# Patient Record
(demographics unavailable — no encounter records)

---

## 2025-01-02 NOTE — PLAN
[Recommended Classification:____] : - The appropriate IEP classification is: [unfilled] [ADHD EDU/Behav. Strategies (Gen)] : - Those educational and behavioral strategies known to be helpful to children with ADHD should be implemented in the classroom. [Monitor Attention] : - [unfilled]'s attention skills will need to continue to be monitored [Speech/Language] : - Speech and language therapy  [Individual In-School Counseling] : - Individual counseling weekly with school psychologist or  [Social Skills] : - Social skills training [Social Skills Group (child)] : - Enrollment of child in a social skills development group [Other: _____] : - [unfilled] [Fish Oil] : - Dietary supplementation with fish oil as a source of omega-3 fatty acids - guidelines and cautions discussed [Fish Oil Handout] : - Fish oil information and guidelines handout [autismspeaks.org] : - autismspeaks.org - Autism Speaks [[email or web address]] : - [email or web address] - Parent to Parent of [facility] [Follow-up visit (re-evaluation): _____] : - Follow-up visit in [unfilled]  for re-evaluation. [Teacher BRS] : - Newly completed teacher behavior rating scale(s) [Parent BRS] : - Newly completed parent behavior rating scale [IEP or IFSP] : - Copy of most recent Individualized Education Program (IEP) or Family Service Plan (IFSP) [Test reports] : - Reports of most recent psychological, educational, speech/language, PT, OT test results [Accuracy] : Accuracy and reliability of clinical impressions [Findings (To Date)] : Findings from evaluation (to date) [Clinical Basis] : Clinical basis for current diagnosis and clinical impressions [Differential Diagnosis] : Differential diagnosis [Dev. Therapies: ____] : Benefits and limits of developmental therapies: [unfilled] [Counseling] : Benefits and limits of counseling or therapy [Family Questions] : Family's questions were addressed [FreeTextEntry2] : - In the classroom, Janie will need more support, guidance, positive reinforcement and feedback than many of her classmates.  - Placement in a classroom in which teachers have expertise in teaching children with autism is recommended. However, child should be grouped with verbal children who demonstrate interest in communicating, and who do not have serious disruptive behavior problems. Information shared for ASD specific programs including [facility] and [facility]. [FreeTextEntry4] : follow up results for genetic testing [FreeTextEntry5] : The goal of this treatment should be to: - address deficits in communication, social interaction, play skills - address any specific safety concerns - improve self help skills Parents on wait list for center based group [facility] program for the summer -Giving Janie the opportunity to participate in playdates and other extracurricular activities with other children to encourage her social development. [FreeTextEntry8] : Previously discussed [de-identified] : https://www.autismspeaks.org/applied-behavior-analysis [FreeTextEntry1] : - Previously discussed ASD specific programs including [facility] and [facility].  Parents currently content with school setting, but agree with pursuing 12:1:1 setting when available.  - Asked for updated Cognitive testing needed through school district since last testing done 6/2021   - C/w Speech and language therapy through [facility] - Discussed option of pursuing private SLT as well if interested; parents will defer for now - Discussed pursuing [facility] program while pending occupational therapy if interested; parents will defer for now - Agree with push-in [facility], recommend increasing hours when feasible - Janie's attention skills will need to continue to be monitored. P/T PIO provided, parents will return at the earliest availability. Will call with any pressing concerns.  F/u in 6 months

## 2025-01-02 NOTE — HISTORY OF PRESENT ILLNESS
[ICT: _____] : Integrated Co-teaching class (Collaborative Team Teaching) [unfilled] [IEP] : Individualized Education Program [AU] : Autism [S-L: _____] : Speech/Language Therapy [unfilled] [OT: ____] : Occupational Therapy [unfilled] [FreeTextEntry4] : [employer] [TWNoteComboBox1] : 1st Grade [FreeTextEntry1] : Janie is a 6 year old girl with ASD, SLD, and inattention  Private services: - Started [facility] ~ 2 months ago, 9 hours/week but qualified for about 20, limited due to provider availability. Was provided push-in at the school, but received at a center over the holiday break. Say this is the only option that worked based on traveling back and forth from school (which is in [place]), would not have been able to fit in the afternoons otherwise.  Academics:  - Started first grade this year, continuing in an ICT because her school doesn't offer the 12:1:1 she was approved for. May be an option at another branch of this [employer] for next year. Previously discussed [facility] vs. [facility] depending on testing by school, but never pursued as family is happy with the current [employer] school she attends.  - Recommended occupational therapy and was added to IEP this year, but don't have any therapists available at the moment so has not gotten it yet - She is able to read but struggles with comprehension questions, especially with inferences. Parents feel that expressive language deficits limit her ability to respond to questions.  - She can do basic counting, adding and subtracting. Feel she memorizes the numbers because she knows certain answers right away but then may struggle with other similar equations (ex. 9+6 she knows, but didn't respond as quickly to 9+1).   - She has a very good memory. She does well with grasping patterns and applying them.  - Gets good grades, teachers are happy    Behavior:  - She has some difficulty with transitions, and may become upset but recovers quickly. Does well with preparation and "this then that" language, can be redirected. - has not hit other children, not physically aggressive   - She is doing well with following routines and directions - She sometimes makes sounds to get attention  Inattention:  - Easily distracted, needs frequent redirection to finish tasks. Parents feel this has significantly improved, but not completely resolved. Much better at home compared with school.  - No difficulties following the daily routines at home. Does well with 2-steps, but may get distracted and start doing something else before reaching a third step - [facility] working with her on focus    Hyperactivity: always "on the go", but able to sit for longer now  Socialization:  - Her interest in children has increased over time. Seems a little more concerned about others' feelings/emotions. Much more affectionate with others. - Teachers say she has friends at school, tends to attract more social kids in class. Peers look out for her and like helping her. - She is better about interacting with kids if its a physical game, tries to play with peers but has difficulty because of poor communication.  - She goes around saying hi to other kids at school- very friendly.  Emotional: She's a happy child.    Anxiety: No difficulty  from parents, no significant anxiety  Communication:  - Mother reports she has a lot more words in general but still prefers to request using 1 word. She will change her request to longer sentences (ex. "can I have ___ please?" ) when parents give her a look or prompt her to do so - + echolalia - She has inconsistent eye contact. Her eye contact is better with family - She has good response to her name. - She is able to follow 2 step directions, may get distracted if longer. - She uses a few signs " please" "thank you". She does not use descriptive gestures. She points. - In the past, would sing songs/rhyme to make associations with things she saw, had delayed speech  Motor Skills: She has good balance and is athletic, no delays in motor skills. She is still working on her pencil , qualified for OT as indicated but not currently getting.  Play Skills: She lines up her toys sometimes. Plays with a variety of toys, demonstrates pretend play    Repetitive Behavior: She likes routine, but is usually flexible with changes if the change is a fun change  Sensory: She does not like the sound of airplanes and will cover her ears. not sensitive to texture.   General: Activities of Daily living: dress herself, brushes her teeth, feeds herself, able to use the microwave, good in helping with her sister. Does well in listening and cooperating with parents' instructions- if they tell her not to touch something, she won't. Has safety awareness. Won't run into the street, no elopement.  Toilet/Constipation: no issues Sleep: Bedtime is at 8pm. Occasional night awakenings, if she does she will go to parents bed and falls right back asleep. Wakes up at 5/6am for the day. Eating: She likes breads, carbs, fruits. Will have tomato sauce. Will not eat meat or vegetables. Will have protein pancakes, eggs, yogurt. She is not interested in trying new things.  Medical workup Neurology : no evaluation Neuroimaging: no imaging Genetics: evaluated by genetics, w/u w/o atypical findings Hearing: evaluated by audiology- wnl per report Vision: no concerns [Major Illness] : no major illness [Major Injury] : no major injury [Surgery] : no surgery [Hospitalizations] : no hospitalizations [New Medications] : no new medication [New Allergies] : no new allergies

## 2025-01-02 NOTE — PHYSICAL EXAM
[External ears normal] : external ears normal [Normal] : patient has a normal gait [de-identified] : - pointed to music box on shelf and repeated "box" verbally with some attempts at utilizing gaze but not robust - repeated occasions of immediate echoing observed with parent, though often followed demands along with the echoing - played quietly with sister for much of visit. No disruptive behaviors.

## 2025-01-02 NOTE — HISTORY OF PRESENT ILLNESS
[ICT: _____] : Integrated Co-teaching class (Collaborative Team Teaching) [unfilled] [IEP] : Individualized Education Program [AU] : Autism [S-L: _____] : Speech/Language Therapy [unfilled] [OT: ____] : Occupational Therapy [unfilled] [FreeTextEntry4] : [employer] [TWNoteComboBox1] : 1st Grade [FreeTextEntry1] : Janie is a 6 year old girl with ASD, SLD, and inattention  Private services: - Started [facility] ~ 2 months ago, 9 hours/week but qualified for about 20, limited due to provider availability. Was provided push-in at the school, but received at a center over the holiday break. Say this is the only option that worked based on traveling back and forth from school (which is in [place]), would not have been able to fit in the afternoons otherwise.  Academics:  - Started first grade this year, continuing in an ICT because her school doesn't offer the 12:1:1 she was approved for. May be an option at another branch of this [employer] for next year. Previously discussed [facility] vs. [facility] depending on testing by school, but never pursued as family is happy with the current [employer] school she attends.  - Recommended occupational therapy and was added to IEP this year, but don't have any therapists available at the moment so has not gotten it yet - She is able to read but struggles with comprehension questions, especially with inferences. Parents feel that expressive language deficits limit her ability to respond to questions.  - She can do basic counting, adding and subtracting. Feel she memorizes the numbers because she knows certain answers right away but then may struggle with other similar equations (ex. 9+6 she knows, but didn't respond as quickly to 9+1).   - She has a very good memory. She does well with grasping patterns and applying them.  - Gets good grades, teachers are happy    Behavior:  - She has some difficulty with transitions, and may become upset but recovers quickly. Does well with preparation and "this then that" language, can be redirected. - has not hit other children, not physically aggressive   - She is doing well with following routines and directions - She sometimes makes sounds to get attention  Inattention:  - Easily distracted, needs frequent redirection to finish tasks. Parents feel this has significantly improved, but not completely resolved. Much better at home compared with school.  - No difficulties following the daily routines at home. Does well with 2-steps, but may get distracted and start doing something else before reaching a third step - [facility] working with her on focus    Hyperactivity: always "on the go", but able to sit for longer now  Socialization:  - Her interest in children has increased over time. Seems a little more concerned about others' feelings/emotions. Much more affectionate with others. - Teachers say she has friends at school, tends to attract more social kids in class. Peers look out for her and like helping her. - She is better about interacting with kids if its a physical game, tries to play with peers but has difficulty because of poor communication.  - She goes around saying hi to other kids at school- very friendly.  Emotional: She's a happy child.    Anxiety: No difficulty  from parents, no significant anxiety  Communication:  - Mother reports she has a lot more words in general but still prefers to request using 1 word. She will change her request to longer sentences (ex. "can I have ___ please?" ) when parents give her a look or prompt her to do so - + echolalia - She has inconsistent eye contact. Her eye contact is better with family - She has good response to her name. - She is able to follow 2 step directions, may get distracted if longer. - She uses a few signs " please" "thank you". She does not use descriptive gestures. She points. - In the past, would sing songs/rhyme to make associations with things she saw, had delayed speech  Motor Skills: She has good balance and is athletic, no delays in motor skills. She is still working on her pencil , qualified for OT as indicated but not currently getting.  Play Skills: She lines up her toys sometimes. Plays with a variety of toys, demonstrates pretend play    Repetitive Behavior: She likes routine, but is usually flexible with changes if the change is a fun change  Sensory: She does not like the sound of airplanes and will cover her ears. not sensitive to texture.   General: Activities of Daily living: dress herself, brushes her teeth, feeds herself, able to use the microwave, good in helping with her sister. Does well in listening and cooperating with parents' instructions- if they tell her not to touch something, she won't. Has safety awareness. Won't run into the street, no elopement.  Toilet/Constipation: no issues Sleep: Bedtime is at 8pm. Occasional night awakenings, if she does she will go to parents bed and falls right back asleep. Wakes up at 5/6am for the day. Eating: She likes breads, carbs, fruits. Will have tomato sauce. Will not eat meat or vegetables. Will have protein pancakes, eggs, yogurt. She is not interested in trying new things.  Medical workup Neurology : no evaluation Neuroimaging: no imaging Genetics: evaluated by genetics, w/u w/o atypical findings Hearing: evaluated by audiology- wnl per report Vision: no concerns [Major Illness] : no major illness [Major Injury] : no major injury [Surgery] : no surgery [Hospitalizations] : no hospitalizations [New Medications] : no new medication [New Allergies] : no new allergies

## 2025-01-02 NOTE — HISTORY OF PRESENT ILLNESS
[ICT: _____] : Integrated Co-teaching class (Collaborative Team Teaching) [unfilled] [IEP] : Individualized Education Program [AU] : Autism [S-L: _____] : Speech/Language Therapy [unfilled] [OT: ____] : Occupational Therapy [unfilled] [FreeTextEntry4] : [employer] [TWNoteComboBox1] : 1st Grade [FreeTextEntry1] : Janie is a 6 year old girl with ASD, SLD, and inattention  Private services: - Started [facility] ~ 2 months ago, 9 hours/week but qualified for about 20, limited due to provider availability. Was provided push-in at the school, but received at a center over the holiday break. Say this is the only option that worked based on traveling back and forth from school (which is in [place]), would not have been able to fit in the afternoons otherwise.  Academics:  - Started first grade this year, continuing in an ICT because her school doesn't offer the 12:1:1 she was approved for. May be an option at another branch of this [employer] for next year. Previously discussed [facility] vs. [facility] depending on testing by school, but never pursued as family is happy with the current [employer] school she attends.  - Recommended occupational therapy and was added to IEP this year, but don't have any therapists available at the moment so has not gotten it yet - She is able to read but struggles with comprehension questions, especially with inferences. Parents feel that expressive language deficits limit her ability to respond to questions.  - She can do basic counting, adding and subtracting. Feel she memorizes the numbers because she knows certain answers right away but then may struggle with other similar equations (ex. 9+6 she knows, but didn't respond as quickly to 9+1).   - She has a very good memory. She does well with grasping patterns and applying them.  - Gets good grades, teachers are happy    Behavior:  - She has some difficulty with transitions, and may become upset but recovers quickly. Does well with preparation and "this then that" language, can be redirected. - has not hit other children, not physically aggressive   - She is doing well with following routines and directions - She sometimes makes sounds to get attention  Inattention:  - Easily distracted, needs frequent redirection to finish tasks. Parents feel this has significantly improved, but not completely resolved. Much better at home compared with school.  - No difficulties following the daily routines at home. Does well with 2-steps, but may get distracted and start doing something else before reaching a third step - [facility] working with her on focus    Hyperactivity: always "on the go", but able to sit for longer now  Socialization:  - Her interest in children has increased over time. Seems a little more concerned about others' feelings/emotions. Much more affectionate with others. - Teachers say she has friends at school, tends to attract more social kids in class. Peers look out for her and like helping her. - She is better about interacting with kids if its a physical game, tries to play with peers but has difficulty because of poor communication.  - She goes around saying hi to other kids at school- very friendly.  Emotional: She's a happy child.    Anxiety: No difficulty  from parents, no significant anxiety  Communication:  - Mother reports she has a lot more words in general but still prefers to request using 1 word. She will change her request to longer sentences (ex. "can I have ___ please?" ) when parents give her a look or prompt her to do so - + echolalia - She has inconsistent eye contact. Her eye contact is better with family - She has good response to her name. - She is able to follow 2 step directions, may get distracted if longer. - She uses a few signs " please" "thank you". She does not use descriptive gestures. She points. - In the past, would sing songs/rhyme to make associations with things she saw, had delayed speech  Motor Skills: She has good balance and is athletic, no delays in motor skills. She is still working on her pencil , qualified for OT as indicated but not currently getting.  Play Skills: She lines up her toys sometimes. Plays with a variety of toys, demonstrates pretend play    Repetitive Behavior: She likes routine, but is usually flexible with changes if the change is a fun change  Sensory: She does not like the sound of airplanes and will cover her ears. not sensitive to texture.   General: Activities of Daily living: dress herself, brushes her teeth, feeds herself, able to use the microwave, good in helping with her sister. Does well in listening and cooperating with parents' instructions- if they tell her not to touch something, she won't. Has safety awareness. Won't run into the street, no elopement.  Toilet/Constipation: no issues Sleep: Bedtime is at 8pm. Occasional night awakenings, if she does she will go to parents bed and falls right back asleep. Wakes up at 5/6am for the day. Eating: She likes breads, carbs, fruits. Will have tomato sauce. Will not eat meat or vegetables. Will have protein pancakes, eggs, yogurt. She is not interested in trying new things.  Medical workup Neurology : no evaluation Neuroimaging: no imaging Genetics: evaluated by genetics, w/u w/o atypical findings Hearing: evaluated by audiology- wnl per report Vision: no concerns [Major Illness] : no major illness [Major Injury] : no major injury [Surgery] : no surgery [Hospitalizations] : no hospitalizations [New Medications] : no new medication [New Allergies] : no new allergies

## 2025-01-02 NOTE — FAMILY HISTORY
[FreeTextEntry1] : Older brother (paternal half brother) has ADHD Otherwise denies family history of autism, seizures, learning difficulties, intellectual disability , anxiety, depression, obsessive compulsive disorder, bipolar disorder or schizophrenia

## 2025-01-02 NOTE — HISTORY OF PRESENT ILLNESS
[ICT: _____] : Integrated Co-teaching class (Collaborative Team Teaching) [unfilled] [IEP] : Individualized Education Program [AU] : Autism [S-L: _____] : Speech/Language Therapy [unfilled] [OT: ____] : Occupational Therapy [unfilled] [FreeTextEntry4] : [employer] [TWNoteComboBox1] : 1st Grade [FreeTextEntry1] : Janie is a 6 year old girl with ASD, SLD, and inattention  Private services: - Started [facility] ~ 2 months ago, 9 hours/week but qualified for about 20, limited due to provider availability. Was provided push-in at the school, but received at a center over the holiday break. Say this is the only option that worked based on traveling back and forth from school (which is in [place]), would not have been able to fit in the afternoons otherwise.  Academics:  - Started first grade this year, continuing in an ICT because her school doesn't offer the 12:1:1 she was approved for. May be an option at another branch of this [facility] for next year. Previously discussed [facility] vs. [facility] depending on testing by school, but never pursued as family is happy with the current [employer] she attends.  - Recommended occupational therapy and was added to IEP this year, but don't have any therapists available at the moment so has not gotten it yet - She is able to read but struggles with comprehension questions, especially with inferences. Parents feel that expressive language deficits limit her ability to respond to questions.  - She can do basic counting, adding and subtracting. Feel she memorizes the numbers because she knows certain answers right away but then may struggle with other similar equations (ex. 9+6 she knows, but didn't respond as quickly to 9+1).   - She has a very good memory. She does well with grasping patterns and applying them.  - Gets good grades, teachers are happy    Behavior:  - She has some difficulty with transitions, and may become upset but recovers quickly. Does well with preparation and "this then that" language, can be redirected. - has not hit other children, not physically aggressive   - She is doing well with following routines and directions - She sometimes makes sounds to get attention  Inattention:  - Easily distracted, needs frequent redirection to finish tasks. Parents feel this has significantly improved, but not completely resolved. Much better at home compared with school.  - No difficulties following the daily routines at home. Does well with 2-steps, but may get distracted and start doing something else before reaching a third step - [facility] working with her on focus    Hyperactivity: always "on the go", but able to sit for longer now  Socialization:  - Her interest in children has increased over time. Seems a little more concerned about others' feelings/emotions. Much more affectionate with others. - Teachers say she has friends at school, tends to attract more social kids in class. Peers look out for her and like helping her. - She is better about interacting with kids if its a physical game, tries to play with peers but has difficulty because of poor communication.  - She goes around saying hi to other kids at school- very friendly.  Emotional: She's a happy child.    Anxiety: No difficulty  from parents, no significant anxiety  Communication:  - Mother reports she has a lot more words in general but still prefers to request using 1 word. She will change her request to longer sentences (ex. "can I have ___ please?" ) when parents give her a look or prompt her to do so - + echolalia - She has inconsistent eye contact. Her eye contact is better with family - She has good response to her name. - She is able to follow 2 step directions, may get distracted if longer. - She uses a few signs " please" "thank you". She does not use descriptive gestures. She points. - In the past, would sing songs/rhyme to make associations with things she saw, had delayed speech  Motor Skills: She has good balance and is athletic, no delays in motor skills. She is still working on her pencil , qualified for OT as indicated but not currently getting.  Play Skills: She lines up her toys sometimes. Plays with a variety of toys, demonstrates pretend play    Repetitive Behavior: She likes routine, but is usually flexible with changes if the change is a fun change  Sensory: She does not like the sound of airplanes and will cover her ears. not sensitive to texture.   General: Activities of Daily living: dress herself, brushes her teeth, feeds herself, able to use the microwave, good in helping with her sister. Does well in listening and cooperating with parents' instructions- if they tell her not to touch something, she won't. Has safety awareness. Won't run into the street, no elopement.  Toilet/Constipation: no issues Sleep: Bedtime is at 8pm. Occasional night awakenings, if she does she will go to parents bed and falls right back asleep. Wakes up at 5/6am for the day. Eating: She likes breads, carbs, fruits. Will have tomato sauce. Will not eat meat or vegetables. Will have protein pancakes, eggs, yogurt. She is not interested in trying new things.  Medical workup Neurology : no evaluation Neuroimaging: no imaging Genetics: evaluated by genetics, w/u w/o atypical findings Hearing: evaluated by audiology- wnl per report Vision: no concerns [Major Illness] : no major illness [Major Injury] : no major injury [Surgery] : no surgery [Hospitalizations] : no hospitalizations [New Medications] : no new medication [New Allergies] : no new allergies

## 2025-01-02 NOTE — ALLERGIES
[TextEntry] : Possibly allergic to Bactrim (mother and younger sister have Bactrim allergies, hence mother is concerned Janie may have the same)

## 2025-01-02 NOTE — PLAN
[Recommended Classification:____] : - The appropriate IEP classification is: [unfilled] [ADHD EDU/Behav. Strategies (Gen)] : - Those educational and behavioral strategies known to be helpful to children with ADHD should be implemented in the classroom. [Monitor Attention] : - [unfilled]'s attention skills will need to continue to be monitored [Speech/Language] : - Speech and language therapy  [Individual In-School Counseling] : - Individual counseling weekly with school psychologist or  [Social Skills] : - Social skills training [Social Skills Group (child)] : - Enrollment of child in a social skills development group [Other: _____] : - [unfilled] [Fish Oil] : - Dietary supplementation with fish oil as a source of omega-3 fatty acids - guidelines and cautions discussed [Fish Oil Handout] : - Fish oil information and guidelines handout [autismspeaks.org] : - autismspeaks.org - Autism Speaks [[email or web address]] : - [email or web address] - Parent to Parent of [facility] [Follow-up visit (re-evaluation): _____] : - Follow-up visit in [unfilled]  for re-evaluation. [Teacher BRS] : - Newly completed teacher behavior rating scale(s) [Parent BRS] : - Newly completed parent behavior rating scale [IEP or IFSP] : - Copy of most recent Individualized Education Program (IEP) or Family Service Plan (IFSP) [Test reports] : - Reports of most recent psychological, educational, speech/language, PT, OT test results [Accuracy] : Accuracy and reliability of clinical impressions [Findings (To Date)] : Findings from evaluation (to date) [Clinical Basis] : Clinical basis for current diagnosis and clinical impressions [Differential Diagnosis] : Differential diagnosis [Dev. Therapies: ____] : Benefits and limits of developmental therapies: [unfilled] [Counseling] : Benefits and limits of counseling or therapy [Family Questions] : Family's questions were addressed [FreeTextEntry2] : - In the classroom, Janie will need more support, guidance, positive reinforcement and feedback than many of her classmates.  - Placement in a classroom in which teachers have expertise in teaching children with autism is recommended. However, child should be grouped with verbal children who demonstrate interest in communicating, and who do not have serious disruptive behavior problems. Information shared for ASD specific programs including [facility] and [facility]. [FreeTextEntry4] : follow up results for genetic testing [FreeTextEntry5] : The goal of this treatment should be to: - address deficits in communication, social interaction, play skills - address any specific safety concerns - improve self help skills Parents on wait list for center based group [facility] program for the summer -Giving Janie the opportunity to participate in playdates and other extracurricular activities with other children to encourage her social development. [FreeTextEntry8] : Previously discussed [de-identified] : https://www.autismspeaks.org/applied-behavior-analysis [FreeTextEntry1] : - Previously discussed ASD specific programs including [facility] and [facility].  Parents currently content with school setting, but agree with pursuing 12:1:1 setting when available.  - Asked for updated Cognitive testing needed through school district since last testing done 6/2021   - C/w Speech and language therapy through [facility] - Discussed option of pursuing private SLT as well if interested; parents will defer for now - Discussed pursuing [facility] program while pending occupational therapy if interested; parents will defer for now - Agree with push-in [facility], recommend increasing hours when feasible - Janie's attention skills will need to continue to be monitored. P/T PIO provided, parents will return at the earliest availability. Will call with any pressing concerns.  F/u in 6 months

## 2025-01-02 NOTE — SOCIAL HISTORY
[FreeTextEntry2] :  [FreeTextEntry3] : /  [FreeTextEntry6] : Lives with mother, father and younger sister Suyapa (2 years younger)

## 2025-01-02 NOTE — PLAN
[Recommended Classification:____] : - The appropriate IEP classification is: [unfilled] [ADHD EDU/Behav. Strategies (Gen)] : - Those educational and behavioral strategies known to be helpful to children with ADHD should be implemented in the classroom. [Monitor Attention] : - [unfilled]'s attention skills will need to continue to be monitored [Speech/Language] : - Speech and language therapy  [Individual In-School Counseling] : - Individual counseling weekly with school psychologist or  [Social Skills] : - Social skills training [Social Skills Group (child)] : - Enrollment of child in a social skills development group [Other: _____] : - [unfilled] [Fish Oil] : - Dietary supplementation with fish oil as a source of omega-3 fatty acids - guidelines and cautions discussed [Fish Oil Handout] : - Fish oil information and guidelines handout [autismspeaks.org] : - autismspeaks.org - Autism Speaks [[email or web address].org] : - [email or web address] - Parent to Parent of [facility] [Follow-up visit (re-evaluation): _____] : - Follow-up visit in [unfilled]  for re-evaluation. [Teacher BRS] : - Newly completed teacher behavior rating scale(s) [Parent BRS] : - Newly completed parent behavior rating scale [IEP or IFSP] : - Copy of most recent Individualized Education Program (IEP) or Family Service Plan (IFSP) [Test reports] : - Reports of most recent psychological, educational, speech/language, PT, OT test results [Accuracy] : Accuracy and reliability of clinical impressions [Findings (To Date)] : Findings from evaluation (to date) [Clinical Basis] : Clinical basis for current diagnosis and clinical impressions [Differential Diagnosis] : Differential diagnosis [Dev. Therapies: ____] : Benefits and limits of developmental therapies: [unfilled] [Counseling] : Benefits and limits of counseling or therapy [Family Questions] : Family's questions were addressed [FreeTextEntry2] : - In the classroom, Janie will need more support, guidance, positive reinforcement and feedback than many of her classmates.  - Placement in a classroom in which teachers have expertise in teaching children with autism is recommended. However, child should be grouped with verbal children who demonstrate interest in communicating, and who do not have serious disruptive behavior problems. Information shared for ASD specific programs including [facility] and [facility]. [FreeTextEntry4] : follow up results for genetic testing [FreeTextEntry5] : The goal of this treatment should be to: - address deficits in communication, social interaction, play skills - address any specific safety concerns - improve self help skills Parents on wait list for center based group [facility] program for the summer -Giving Janie the opportunity to participate in playdates and other extracurricular activities with other children to encourage her social development. [FreeTextEntry8] : Previously discussed [de-identified] : https://www.autismspeaks.org/applied-behavior-analysis [FreeTextEntry1] : - Previously discussed ASD specific programs including [facility] and [facility].  Parents currently content with school setting, but agree with pursuing 12:1:1 setting when available.  - Asked for updated Cognitive testing needed through school district since last testing done 6/2021   - [place] [facility] [place] Discussed option of pursuing private SLT as well if interested; parents will defer for now - Discussed pursuing SEED program while pending occupational therapy if interested; parents will defer for now - Agree with push-in [facility], recommend increasing hours when feasible - Janie's attention skills will need to continue to be monitored. P/T PIO provided, parents will return at the earliest availability. Will call with any pressing concerns.  F/u in 6 months

## 2025-01-02 NOTE — PLAN
[Recommended Classification:____] : - The appropriate IEP classification is: [unfilled] [ADHD EDU/Behav. Strategies (Gen)] : - Those educational and behavioral strategies known to be helpful to children with ADHD should be implemented in the classroom. [Monitor Attention] : - [unfilled]'s attention skills will need to continue to be monitored [Speech/Language] : - Speech and language therapy  [Individual In-School Counseling] : - Individual counseling weekly with school psychologist or  [Social Skills] : - Social skills training [Social Skills Group (child)] : - Enrollment of child in a social skills development group [Other: _____] : - [unfilled] [Fish Oil] : - Dietary supplementation with fish oil as a source of omega-3 fatty acids - guidelines and cautions discussed [Fish Oil Handout] : - Fish oil information and guidelines handout [autismspeaks.org] : - autismspeaks.org - Autism Speaks [[email or web address].org] : - [email or web address] - Parent to Parent of [facility] [Follow-up visit (re-evaluation): _____] : - Follow-up visit in [unfilled]  for re-evaluation. [Teacher BRS] : - Newly completed teacher behavior rating scale(s) [Parent BRS] : - Newly completed parent behavior rating scale [IEP or IFSP] : - Copy of most recent Individualized Education Program (IEP) or Family Service Plan (IFSP) [Test reports] : - Reports of most recent psychological, educational, speech/language, PT, OT test results [Accuracy] : Accuracy and reliability of clinical impressions [Findings (To Date)] : Findings from evaluation (to date) [Clinical Basis] : Clinical basis for current diagnosis and clinical impressions [Differential Diagnosis] : Differential diagnosis [Dev. Therapies: ____] : Benefits and limits of developmental therapies: [unfilled] [Counseling] : Benefits and limits of counseling or therapy [Family Questions] : Family's questions were addressed [FreeTextEntry2] : - In the classroom, Janie will need more support, guidance, positive reinforcement and feedback than many of her classmates.  - Placement in a classroom in which teachers have expertise in teaching children with autism is recommended. However, child should be grouped with verbal children who demonstrate interest in communicating, and who do not have serious disruptive behavior problems. Information shared for ASD specific programs including [facility] and [facility]. [FreeTextEntry4] : follow up results for genetic testing [FreeTextEntry5] : The goal of this treatment should be to: - address deficits in communication, social interaction, play skills - address any specific safety concerns - improve self help skills Parents on wait list for center based group [facility] program for the summer -Giving Janie the opportunity to participate in playdates and other extracurricular activities with other children to encourage her social development. [FreeTextEntry8] : Previously discussed [de-identified] : https://www.autismspeaks.org/applied-behavior-analysis [FreeTextEntry1] : - Previously discussed ASD specific programs including [facility] and [facility].  Parents currently content with school setting, but agree with pursuing 12:1:1 setting when available.  - Asked for updated Cognitive testing needed through school district since last testing done 6/2021   - C/w Speech and language therapy through [facility] - Discussed option of pursuing private SLT as well if interested; parents will defer for now - Discussed pursuing SEED program while pending occupational therapy if interested; parents will defer for now - Agree with push-in [facility], recommend increasing hours when feasible - Janie's attention skills will need to continue to be monitored. P/T PIO provided, parents will return at the earliest availability. Will call with any pressing concerns.  F/u in 6 months

## 2025-01-02 NOTE — SOCIAL HISTORY
Refer to the Assessment tab to view/cancel completed assessment. [FreeTextEntry2] :  [FreeTextEntry3] : /  [FreeTextEntry6] : Lives with mother, father and younger sister Suyapa (2 years younger)

## 2025-01-02 NOTE — PHYSICAL EXAM
[External ears normal] : external ears normal [Normal] : patient has a normal gait [de-identified] : - pointed to music box on shelf and repeated "box" verbally with some attempts at utilizing gaze but not robust - repeated occasions of immediate echoing observed with parent, though often followed demands along with the echoing - played quietly with sister for much of visit. No disruptive behaviors.

## 2025-01-02 NOTE — PHYSICAL EXAM
[External ears normal] : external ears normal [Normal] : patient has a normal gait [de-identified] : - pointed to music box on shelf and repeated "box" verbally with some attempts at utilizing gaze but not robust - repeated occasions of immediate echoing observed with parent, though often followed demands along with the echoing - played quietly with sister for much of visit. No disruptive behaviors.

## 2025-01-02 NOTE — REASON FOR VISIT
[Follow-Up Visit] : a follow-up visit for [Autism Spectrum Disorder] : autism spectrum disorder [Speech/Language] : speech/language problems [Mother] : mother [Father] : father [FreeTextEntry3] : 6/2024

## 2025-07-08 NOTE — PAST MEDICAL HISTORY
Verbal/written post procedure instructions were given to patient/caregiver./Instructed patient/caregiver to follow-up with primary care physician./Instructed patient/caregiver regarding signs and symptoms of infection.
[FreeTextEntry1] : No  staring spells or seizure like activity
[FreeTextEntry1] : No  staring spells or seizure like activity

## 2025-07-08 NOTE — PLAN
[Accuracy] : Accuracy and reliability of clinical impressions [Findings (To Date)] : Findings from evaluation (to date) [Clinical Basis] : Clinical basis for current diagnosis and clinical impressions [Differential Diagnosis] : Differential diagnosis [Dev. Therapies: ____] : Benefits and limits of developmental therapies: [unfilled] [Counseling] : Benefits and limits of counseling or therapy [Family Questions] : Family's questions were addressed [FreeTextEntry1] : - Previously discussed ASD specific programs including [facility] and [facility].  Parents currently content with school setting, and doing well academically with the private [facility] pushing in, so will maintain the same placement for next year - C/w Speech and language therapy through [facility] - Agree with [facility]-provided OT on weekends - Agree with pursuing SEED program  - Agree with increased [facility] hours - briefly discussed CAM use, including the current LingoLeap drops parents are using vs. Leucovorin. Will send parents information on Leucovorin studies. - Janie's attention skills will need to continue to be monitored. P/T PIO provided, will obtain prior to net visit. Will call with any pressing concerns.  F/u in 6 months

## 2025-07-08 NOTE — REASON FOR VISIT
[Follow-Up Visit] : a follow-up visit for [Autism Spectrum Disorder] : autism spectrum disorder [Speech/Language] : speech/language problems [Mother] : mother [Father] : father [FreeTextEntry3] : 1/2025

## 2025-07-08 NOTE — HISTORY OF PRESENT ILLNESS
[ICT: _____] : Integrated Co-teaching class (Collaborative Team Teaching) [unfilled] [IEP] : Individualized Education Program [AU] : Autism [OT: ____] : Occupational Therapy [unfilled] [S-L: _____] : Speech/Language Therapy [unfilled] [Mother] : mother [Home] : at home, [unfilled] , at the time of the visit. [Other Location: e.g. Home (Enter Location, City,State)___] : at [unfilled] [Telehealth (audio & video)] : This visit was provided via telehealth using real-time 2-way audio visual technology. [FreeTextEntry3] : Mother [FreeTextEntry4] : [employer] [TWNoteComboBox1] : 1st Grade [FreeTextEntry1] : Janie is a 7 year old girl with ASD, SLD, and inattention  Interim Hx: - Made a good progress with spontaneous language, especially when family goes on trips over the summer- will be more communicative. Uses a lot of "first___, then___" language with parents. - Recently started a supplement (Lingo Leap- Zeolite/B12/VitD) that parents feel like has been helpful, but can't say if it's more the [facility] that made a difference or the supplement  Private services: - Started [facility] in fall of 2024, 15 hours/week (3 hours in school every day) but qualified for about 20, limited due to provider availability. Was provided push-in at the school, but received at a center over holiday breaks. Say this is the only option that worked based on traveling back and forth from school (which is in [place]), would not have been able to fit in the afternoons otherwise. - Goes to an OT center near home on Sundays through [facility] ([facility] [place])  Academics:  - Completed 1st grade in ICT because her school didn't offer the 12:1:1 she was approved for. Teacher RS from January show GL performance, and was doing well in this setting with her [facility] provider, so will keep int he same setting. Previously discussed [employer] vs. [employer] depending on testing by school, but never pursued as family is happy with the current [employer] school she attends. Will have an IEP reconvene in September.  - Occupational therapy added to IEP this past year, but didn't have any therapists available. Pending assignments for next year. - She is able to read but struggles with comprehension questions, especially with inferences. Parents feel that expressive language deficits limit her ability to respond to questions.  - She can do basic counting, adding and subtracting. Feel she memorizes the numbers because she knows certain answers right away but then may struggle with other similar equations (ex. 9+6 she knows, but didn't respond as quickly to 9+1).   - She has very good memory. She does well with grasping patterns and applying them.  - Got good grades this year, teachers are happy. She was being praised about good progress this year.  - Doing a lot of activities with family this summer, planning to go to [employer] [facility] at the end of the month. Grandparents helping a bit during the summer, and she goes to an [facility] center x3 days/week.     Behavior:  - She has some difficulty with transitions, and may become upset but recovers quickly. Does well with preparation and "this then that" language, can be redirected. - She is doing well with following routines and directions - She sometimes makes sounds to get attention  Inattention:  - Easily distracted, needs frequent redirection to finish tasks. Parents feel this has significantly improved, but not completely resolved. Much better at home compared with school.  - No difficulties following the daily routines at home. Does well with 2-steps, but may get distracted and start doing something else before reaching a third step - [employer] working with her on focus    Hyperactivity: always "on the go", but able to sit for longer now  Socialization:  - Her interest in children has increased over time. Seems more concerned about others' feelings/emotions. Much more affectionate with others. - Teachers say she has friends at school, tends to attract more social kids in class. Peers look out for her and like helping her. - She is better about interacting with kids if its a physical game, tries to play with peers but has difficulty because of poor communication.  - She goes around saying hi to other kids at school- very friendly.  Emotional: She's a happy child.    Anxiety: No difficulty  from parents, no significant anxiety  Communication:  - Mother reports she has a lot more words in general but still prefers to request using 1 word. She will change her request to longer sentences (ex. "can I have ___ please?" ) when parents give her a look or prompt her to do so - (+) echolalia - She has inconsistent eye contact. Her eye contact is better with family - She has good response to her name. - She is able to follow 2 step directions, may get distracted if longer. - She uses a few signs " please" "thank you". She does not use descriptive gestures. She points. - In the past, would sing songs/rhyme to make associations with things she saw, had delayed speech  Motor Skills: She has good balance and is athletic, no delays in motor skills. She is still working on her pencil , qualified for OT as indicated but not currently getting.  Play Skills: She lines up her toys sometimes. Plays with a variety of toys, demonstrates pretend play    Repetitive Behavior: She likes routine, but is usually flexible with changes if the change is a fun change  Sensory: She does not like the sound of airplanes and will cover her ears. not sensitive to texture.   General: Activities of Daily living: dress herself, brushes her teeth, feeds herself, able to use the microwave, good in helping with her sister. Does well in listening and cooperating with parents' instructions- if they tell her not to touch something, she won't. Has safety awareness. Won't run into the street, no elopement.  Toilet/Constipation: no issues Sleep: Bedtime is at 8pm. Occasional night awakenings, if she does she will go to parents bed and falls right back asleep. Wakes up at 5/6am for the day. Eating: She likes breads, carbs, fruits. Will have tomato sauce. Will not eat meat or vegetables. Will have protein pancakes, eggs, yogurt. She is not interested in trying new things.  Medical workup Neurology : no evaluation Neuroimaging: no imaging Genetics: evaluated by genetics, w/u w/o atypical findings Hearing: evaluated by audiology- wnl per report Vision: no concerns [Major Illness] : no major illness [Major Injury] : no major injury [Surgery] : no surgery [Hospitalizations] : no hospitalizations [New Medications] : no new medication [New Allergies] : no new allergies

## 2025-07-08 NOTE — HISTORY OF PRESENT ILLNESS
[ICT: _____] : Integrated Co-teaching class (Collaborative Team Teaching) [unfilled] [IEP] : Individualized Education Program [AU] : Autism [OT: ____] : Occupational Therapy [unfilled] [S-L: _____] : Speech/Language Therapy [unfilled] [Mother] : mother [Home] : at home, [unfilled] , at the time of the visit. [Other Location: e.g. Home (Enter Location, City,State)___] : at [unfilled] [Telehealth (audio & video)] : This visit was provided via telehealth using real-time 2-way audio visual technology. [FreeTextEntry3] : Mother [FreeTextEntry4] : [employer] [TWNoteComboBox1] : 1st Grade [FreeTextEntry1] : Janie is a 7 year old girl with ASD, SLD, and inattention  Interim Hx: - Made a good progress with spontaneous language, especially when family goes on trips over the summer- will be more communicative. Uses a lot of "first___, then___" language with parents. - Recently started a supplement (Lingo Leap- Zeolite/B12/VitD) that parents feel like has been helpful, but can't say if it's more the [employer] that made a difference or the supplement  Private services: - Started [facility] in fall of 2024, 15 hours/week (3 hours in school every day) but qualified for about 20, limited due to provider availability. Was provided push-in at the school, but received at a center over holiday breaks. Say this is the only option that worked based on traveling back and forth from school (which is in [place]), would not have been able to fit in the afternoons otherwise. - Goes to an OT center near home on Sundays through [facility] ([facility] [place])  Academics:  - Completed 1st grade in ICT because her school didn't offer the 12:1:1 she was approved for. Teacher RS from January show GL performance, and was doing well in this setting with her [facility] provider, so will keep int he same setting. Previously discussed [employer] vs. [employer] depending on testing by school, but never pursued as family is happy with the current [employer] school she attends. Will have an IEP reconvene in September.  - Occupational therapy added to IEP this past year, but didn't have any therapists available. Pending assignments for next year. - She is able to read but struggles with comprehension questions, especially with inferences. Parents feel that expressive language deficits limit her ability to respond to questions.  - She can do basic counting, adding and subtracting. Feel she memorizes the numbers because she knows certain answers right away but then may struggle with other similar equations (ex. 9+6 she knows, but didn't respond as quickly to 9+1).   - She has very good memory. She does well with grasping patterns and applying them.  - Got good grades this year, teachers are happy. She was being praised about good progress this year.  - Doing a lot of activities with family this summer, planning to go to [employer] at the end of the month. Grandparents helping a bit during the summer, and she goes to an [facility] center x3 days/week.     Behavior:  - She has some difficulty with transitions, and may become upset but recovers quickly. Does well with preparation and "this then that" language, can be redirected. - She is doing well with following routines and directions - She sometimes makes sounds to get attention  Inattention:  - Easily distracted, needs frequent redirection to finish tasks. Parents feel this has significantly improved, but not completely resolved. Much better at home compared with school.  - No difficulties following the daily routines at home. Does well with 2-steps, but may get distracted and start doing something else before reaching a third step - [facility] working with her on focus    Hyperactivity: always "on the go", but able to sit for longer now  Socialization:  - Her interest in children has increased over time. Seems more concerned about others' feelings/emotions. Much more affectionate with others. - Teachers say she has friends at school, tends to attract more social kids in class. Peers look out for her and like helping her. - She is better about interacting with kids if its a physical game, tries to play with peers but has difficulty because of poor communication.  - She goes around saying hi to other kids at school- very friendly.  Emotional: She's a happy child.    Anxiety: No difficulty  from parents, no significant anxiety  Communication:  - Mother reports she has a lot more words in general but still prefers to request using 1 word. She will change her request to longer sentences (ex. "can I have ___ please?" ) when parents give her a look or prompt her to do so - (+) echolalia - She has inconsistent eye contact. Her eye contact is better with family - She has good response to her name. - She is able to follow 2 step directions, may get distracted if longer. - She uses a few signs " please" "thank you". She does not use descriptive gestures. She points. - In the past, would sing songs/rhyme to make associations with things she saw, had delayed speech  Motor Skills: She has good balance and is athletic, no delays in motor skills. She is still working on her pencil , qualified for OT as indicated but not currently getting.  Play Skills: She lines up her toys sometimes. Plays with a variety of toys, demonstrates pretend play    Repetitive Behavior: She likes routine, but is usually flexible with changes if the change is a fun change  Sensory: She does not like the sound of airplanes and will cover her ears. not sensitive to texture.   General: Activities of Daily living: dress herself, brushes her teeth, feeds herself, able to use the microwave, good in helping with her sister. Does well in listening and cooperating with parents' instructions- if they tell her not to touch something, she won't. Has safety awareness. Won't run into the street, no elopement.  Toilet/Constipation: no issues Sleep: Bedtime is at 8pm. Occasional night awakenings, if she does she will go to parents bed and falls right back asleep. Wakes up at 5/6am for the day. Eating: She likes breads, carbs, fruits. Will have tomato sauce. Will not eat meat or vegetables. Will have protein pancakes, eggs, yogurt. She is not interested in trying new things.  Medical workup Neurology : no evaluation Neuroimaging: no imaging Genetics: evaluated by genetics, w/u w/o atypical findings Hearing: evaluated by audiology- wnl per report Vision: no concerns [Major Illness] : no major illness [Major Injury] : no major injury [Surgery] : no surgery [Hospitalizations] : no hospitalizations [New Medications] : no new medication [New Allergies] : no new allergies